# Patient Record
Sex: FEMALE | Race: OTHER
[De-identification: names, ages, dates, MRNs, and addresses within clinical notes are randomized per-mention and may not be internally consistent; named-entity substitution may affect disease eponyms.]

---

## 2017-12-19 ENCOUNTER — HOSPITAL ENCOUNTER (OUTPATIENT)
Dept: HOSPITAL 10 - HKI | Age: 42
Discharge: HOME | End: 2017-12-19
Attending: ORTHOPAEDIC SURGERY
Payer: COMMERCIAL
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
## 2017-12-19 DIAGNOSIS — S83.241D: ICD-10-CM

## 2017-12-19 DIAGNOSIS — F32.9: ICD-10-CM

## 2017-12-19 DIAGNOSIS — D64.9: ICD-10-CM

## 2017-12-19 DIAGNOSIS — M17.11: Primary | ICD-10-CM

## 2017-12-19 PROCEDURE — 73562 X-RAY EXAM OF KNEE 3: CPT

## 2017-12-19 PROCEDURE — G0463 HOSPITAL OUTPT CLINIC VISIT: HCPCS

## 2017-12-20 NOTE — HKNOTE
DATE OF SERVICE:  

 

 

Thank you for referring Magali Walker who was seen in my office today complaining of pain, swellin
g and instability of her right knee.

 

She has the classic symptoms, clinical findings and imaging findings of a torn medial meniscus super
imposed on mild arthritis of the knee.

 

She most definitely is way overdue to have an arthroscopic operation on the right knee.

 

She is anxious to proceed as soon as possible.

 

Thank you for your confidence and continued support.  Enclosed is a copy of my office notes for your
 records.

 

With warmest regards,

 

 

Dictated By: JAQUELIN LEWIS MD

 

HH/NTS

DD:    12/19/2017 18:46:24

DT:    12/20/2017 11:16:44

Conf#: [id number]

DID#:  [id number]

CC: Nanette Mathew NP;*EndCC*

## 2017-12-20 NOTE — RADRPT
PROCEDURE:   Bilateral knee x-ray 

 

CLINICAL INDICATION:   Bilateral knee pain

 

TECHNIQUE:   AP, lateral and axial views of the knees are obtained with the patient weight bearing.

 

 

COMPARISON:   None 

 

FINDINGS:

 

Right knee:

 

There is normal mineralization.  

No acute fracture or dislocation is seen.  

There is mild joint space narrowing in the medial femorotibial compartment with small medial and pos
terior patellar osteophytes.

There is a small joint effusion.

There is no significant soft tissue swelling. 

 

 

Left knee:

 

There is normal mineralization.  

No acute fracture or dislocation is seen.  

There is mild joint space narrowing in the medial and lateral femorotibial compartments with small p
osterior patellar osteophytes.

There is no joint effusion.

There is no significant soft tissue swelling. 

 

IMPRESSION:

 

1.  Mild joint space narrowing in the medial right femorotibial compartment with small medial and po
sterior patellar osteophytes.

2.  Mild joint space narrowing in the medial and lateral femorotibial compartments on the left with 
small posterior patellar osteophytes.

3.  Small right joint effusion.

 

RPTAT:AAJJ

_____________________________________________ 

Physician Kae           Date    Time 

Electronically viewed and signed by Physician Kae on 12/20/2017 09:19 

 

D:  12/20/2017 09:19  T:  12/20/2017 09:19

/

## 2017-12-20 NOTE — HKNOTE
DATE OF SERVICE:  12/19/2017

 

 

REFERRING PROVIDER:  Nanette Mathew, [id number] [id number]

 

MAIN COMPLAINT:  Pain, swelling and locking of the right knee.

 

HISTORY OF MAIN COMPLAINT:  Patient is a cane 42-year-old female who complains of pain in her right 
knee which started about a year ago (11/2015) when she got up from a chair and twisted her right kne
e.  She had immediate severe pain in the knee and was unable to walk for several weeks without a hyde
p.  She did not see a physician until 05/2016 "because I was under clinic care."  She saw Dr. Schmitz
 who diagnosed arthritis of the knee, but stated "not ready for knee replacements yet."  No treatmen
t was given.  She continued to have pain.  The pain has become much worse in the last month.

 

PRESENT COMPLAINTS:  The right knee swells, feels unstable and locks when she is getting up from a s
itting position.  Pain is aggravated by walking, weightbearing and stair climbing.  She had been babita
ing Naprosyn, which does not help very much.  She has also had physical therapy which has not helped
 very much.  She does not use a walking aid.  She does limp all the time.  Her leg lengths feel equa
l.  She does not have a shoe lift.

 

PAST ORTHOPEDIC HISTORY

PREVIOUS ORTHOPEDIC OPERATIONS:  None.

 

PRIOR CORTISONE INTAKE:  None.

 

ALCOHOL INTAKE:  None.

 

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

 

PAST MEDICAL HISTORY:  Negative.

 

PAST SURGICAL HISTORY:  Gallbladder removed in 1994, gastric bypass surgery 2003, tonsillectomy 2005
.

 

ALLERGIES:  PENICILLIN.

 

MEDICATIONS:

1.  Ferrous sulfate for anemia. 

2.  Vitamin D2 for low vitamin D level.

3.  Naproxen 500 mg twice daily for knee pain. 

4.  Bupropion 10 mg by mouth daily by mouth for depression.

 

FAMILY HISTORY:  Noncontributory.

 

SYSTEMS REVIEW:  Not provided by the patient.

 

PHYSICAL EXAMINATION:

GENERAL:  The patient is an overweight 42-year-old female.

VITAL SIGNS:  Height 5 feet 3 inches, weight 223 pounds.  Blood pressure 115/60, temperature 99.0 (r
epeated at 98.2). 

GAIT:  Patient's gait is antalgic.  She walks without a walking aid.

HIPS:  Both hips have a full range of motion without pain.  No tenderness around either hip.

NEUROLOGIC:  Motor examination reveals no muscle deficit in the lower extremities. Deep tendon refle
xes in the lower extremities:  Right knee +, left knee +, right ankle +, left ankle +.  Straight leg
 raising is negative bilaterally at 70 degrees. Lasegue and ARACELIS tests are negative.

RIGHT KNEE:  The right knee shows normal alignment. Extension lacks 5 degrees. Flexion is 95 degrees
 (4+ pain at the limits of motion. The medial and lateral collateral ligaments and cruciate ligament
s are intact. Lachman test is negative. There is no effusion, tenderness, scarring, crepitus, or cys
ts. The patella tracks normally. There is no tenderness on the articular surface of the patella or i
n the patellar groove. The Q angle is normal.  Marked tenderness over the medial joint line.

LEFT KNEE:  The left knee shows normal alignment. Active and passive extension is 0 degrees. Active 
and passive flexion is 135 degrees. The medial and lateral collateral ligaments and cruciate ligamen
ts are intact. Lachman test is negative. There is no effusion, tenderness, scarring, crepitus, or cy
sts. The patella tracks normally. There is no tenderness on the articular surface of the patella or 
in the patellar groove. The Q angle is normal.

 

IMAGING:  X-rays of her knee obtained today reviewed, and the right knee shows mild narrowing of the
 medial joint space and patellofemoral joint.

 

The left knee x-rays show mild narrowing of the medial joint space as well as the lateral joint spac
e and patellofemoral joint.  However, the joint spaces in both knees are still well maintained, with
out any secondary osteoarthritic changes.

 

A recent MRI scan of the right knee is reported as showing degenerative changes in all 3 compartment
s and compound tears of the medial meniscus.

 

DISCUSSION:  The clinical presentation as well as the concerning MRI fit together very well, for a c
linical diagnosis of mild arthritis of the right knee and compound tears of the medial meniscus.

 

The  patient is advised that she most certainly has all the clinical features and imaging features o
f a torn medial meniscus.

 

She is advised that she will need to have an arthroscopic operation on the right knee.  The procedur
e and some of the major possible complications were discussed with her in a fair amount of detail.

 

The postoperative course was discussed with her in a fair amount of detail.

 

FINAL DIAGNOSES:

1.  Mild degenerative osteoarthritis of the right knee.

2.  Compound tears of the medial meniscus of the right knee.

3.  Anemia.

4.  Depression.

 

Patient will be scheduled for knee arthroscopy as soon as authorization to proceed is received.

 

 

Dictated By: JAQUELIN ORELLANA/FRANCHESKA

DD:    12/19/2017 18:44:21

DT:    12/20/2017 11:09:30

Conf#: [id number]

DID#:  [id number]

CC: Nanette Mathew;*EndCC*

## 2018-02-22 ENCOUNTER — HOSPITAL ENCOUNTER (OUTPATIENT)
Age: 43
Discharge: HOME | End: 2018-02-22

## 2018-02-22 ENCOUNTER — HOSPITAL ENCOUNTER (OUTPATIENT)
Dept: HOSPITAL 91 - HKI | Age: 43
Discharge: HOME | End: 2018-02-22
Payer: COMMERCIAL

## 2018-02-22 DIAGNOSIS — M25.561: ICD-10-CM

## 2018-02-22 DIAGNOSIS — M23.251: Primary | ICD-10-CM

## 2018-02-22 DIAGNOSIS — Z98.84: ICD-10-CM

## 2018-02-22 DIAGNOSIS — F32.9: ICD-10-CM

## 2018-02-22 DIAGNOSIS — F41.9: ICD-10-CM

## 2018-02-22 DIAGNOSIS — Z72.0: ICD-10-CM

## 2018-05-10 ENCOUNTER — HOSPITAL ENCOUNTER (OUTPATIENT)
Dept: HOSPITAL 91 - HKI | Age: 43
Discharge: HOME | End: 2018-05-10
Payer: COMMERCIAL

## 2018-05-10 DIAGNOSIS — Z01.818: Primary | ICD-10-CM

## 2018-05-11 ENCOUNTER — HOSPITAL ENCOUNTER (OUTPATIENT)
Dept: HOSPITAL 91 - SDS | Age: 43
Discharge: HOME | End: 2018-05-11
Payer: COMMERCIAL

## 2018-05-11 ENCOUNTER — HOSPITAL ENCOUNTER (OUTPATIENT)
Age: 43
Discharge: HOME | End: 2018-05-11

## 2018-05-11 DIAGNOSIS — M23.221: ICD-10-CM

## 2018-05-11 DIAGNOSIS — M23.251: Primary | ICD-10-CM

## 2018-05-11 DIAGNOSIS — E66.9: ICD-10-CM

## 2018-05-11 DIAGNOSIS — M94.261: ICD-10-CM

## 2018-05-11 PROCEDURE — 29880 ARTHRS KNE SRG MNISECTMY M&L: CPT

## 2018-05-11 RX ADMIN — ONDANSETRON HYDROCHLORIDE 1 MG: 2 INJECTION, SOLUTION INTRAMUSCULAR; INTRAVENOUS at 08:27

## 2018-05-11 RX ADMIN — LIDOCAINE HYDROCHLORIDE 1 ML: 10 INJECTION, SOLUTION EPIDURAL; INFILTRATION; INTRACAUDAL; PERINEURAL at 10:15

## 2018-05-11 RX ADMIN — HYDROMORPHONE HYDROCHLORIDE 1 MG: 2 INJECTION INTRAMUSCULAR; INTRAVENOUS; SUBCUTANEOUS at 10:58

## 2018-05-11 RX ADMIN — HYDROMORPHONE HYDROCHLORIDE 1 MG: 2 INJECTION INTRAMUSCULAR; INTRAVENOUS; SUBCUTANEOUS at 10:50

## 2018-05-11 RX ADMIN — CELECOXIB 1 MG: 200 CAPSULE ORAL at 08:27

## 2018-05-11 RX ADMIN — TRIAMCINOLONE ACETONIDE 1 MG: 40 INJECTION, SUSPENSION INTRA-ARTICULAR; INTRAMUSCULAR at 10:15

## 2018-05-11 RX ADMIN — Medication 1 MLS/HR: at 08:27

## 2018-05-11 RX ADMIN — OXYCODONE HYDROCHLORIDE 1 MG: 10 TABLET, FILM COATED, EXTENDED RELEASE ORAL at 08:28

## 2018-05-11 RX ADMIN — Medication 1 MLS/HR: at 12:21

## 2018-05-11 RX ADMIN — DEXAMETHASONE SODIUM PHOSPHATE 1 MG: 4 INJECTION, SOLUTION INTRAMUSCULAR; INTRAVENOUS at 08:27

## 2018-05-11 RX ADMIN — LANSOPRAZOLE 1 MG: 30 CAPSULE, DELAYED RELEASE PELLETS ORAL at 08:28

## 2018-05-21 ENCOUNTER — HOSPITAL ENCOUNTER (OUTPATIENT)
Dept: HOSPITAL 91 - HKI | Age: 43
Discharge: HOME | End: 2018-05-21
Payer: COMMERCIAL

## 2018-05-21 ENCOUNTER — HOSPITAL ENCOUNTER (OUTPATIENT)
Age: 43
Discharge: HOME | End: 2018-05-21

## 2018-05-21 DIAGNOSIS — S83.241D: ICD-10-CM

## 2018-05-21 DIAGNOSIS — Z47.89: Primary | ICD-10-CM

## 2018-05-21 DIAGNOSIS — S83.281D: ICD-10-CM
